# Patient Record
Sex: FEMALE | URBAN - METROPOLITAN AREA
[De-identification: names, ages, dates, MRNs, and addresses within clinical notes are randomized per-mention and may not be internally consistent; named-entity substitution may affect disease eponyms.]

---

## 2024-10-21 PROCEDURE — 88175 CYTOPATH C/V AUTO FLUID REDO: CPT

## 2024-10-22 ENCOUNTER — LAB REQUISITION (OUTPATIENT)
Dept: LAB | Facility: HOSPITAL | Age: 24
End: 2024-10-22

## 2024-10-22 DIAGNOSIS — Z11.51 ENCOUNTER FOR SCREENING FOR HUMAN PAPILLOMAVIRUS (HPV): ICD-10-CM

## 2024-10-22 DIAGNOSIS — Z34.90 ENCOUNTER FOR SUPERVISION OF NORMAL PREGNANCY, UNSPECIFIED, UNSPECIFIED TRIMESTER: ICD-10-CM

## 2024-10-22 LAB
C TRACH RRNA SPEC QL NAA+PROBE: NEGATIVE
N GONORRHOEA DNA SPEC QL PROBE+SIG AMP: NEGATIVE

## 2024-10-23 LAB — BACTERIA UR CULT: NO GROWTH

## 2024-11-04 LAB
CYTOLOGY CMNT CVX/VAG CYTO-IMP: NORMAL
LAB AP HPV GENOTYPE QUESTION: YES
LAB AP HPV HR: NORMAL
LABORATORY COMMENT REPORT: NORMAL
MENSTRUAL HX REPORTED: NORMAL
PATH REPORT.TOTAL CANCER: NORMAL

## 2025-01-13 ENCOUNTER — LAB (OUTPATIENT)
Dept: LAB | Facility: LAB | Age: 25
End: 2025-01-13

## 2025-01-13 DIAGNOSIS — Z34.90 ENCOUNTER FOR SUPERVISION OF NORMAL PREGNANCY, UNSPECIFIED, UNSPECIFIED TRIMESTER: Primary | ICD-10-CM

## 2025-01-13 LAB
ABO GROUP (TYPE) IN BLOOD: NORMAL
ANTIBODY SCREEN: NORMAL
ERYTHROCYTE [DISTWIDTH] IN BLOOD BY AUTOMATED COUNT: 14 % (ref 11.5–14.5)
GLUCOSE 1H P 50 G GLC PO SERPL-MCNC: 75 MG/DL
HCT VFR BLD AUTO: 36.5 % (ref 36–46)
HGB BLD-MCNC: 12.1 G/DL (ref 12–16)
MCH RBC QN AUTO: 31.3 PG (ref 26–34)
MCHC RBC AUTO-ENTMCNC: 33.2 G/DL (ref 32–36)
MCV RBC AUTO: 95 FL (ref 80–100)
NRBC BLD-RTO: 0 /100 WBCS (ref 0–0)
PLATELET # BLD AUTO: 165 X10*3/UL (ref 150–450)
RBC # BLD AUTO: 3.86 X10*6/UL (ref 4–5.2)
REFLEX ADDED, ANEMIA PANEL: NORMAL
RH FACTOR (ANTIGEN D): NORMAL
WBC # BLD AUTO: 9.9 X10*3/UL (ref 4.4–11.3)

## 2025-01-13 PROCEDURE — 82947 ASSAY GLUCOSE BLOOD QUANT: CPT

## 2025-01-13 PROCEDURE — 86901 BLOOD TYPING SEROLOGIC RH(D): CPT

## 2025-01-13 PROCEDURE — 86900 BLOOD TYPING SEROLOGIC ABO: CPT

## 2025-01-13 PROCEDURE — 86850 RBC ANTIBODY SCREEN: CPT

## 2025-01-13 PROCEDURE — 85027 COMPLETE CBC AUTOMATED: CPT

## 2025-02-03 RX ORDER — B-COMPLEX WITH VITAMIN C
1 TABLET ORAL DAILY
COMMUNITY

## 2025-02-10 ENCOUNTER — APPOINTMENT (OUTPATIENT)
Facility: CLINIC | Age: 25
End: 2025-02-10

## 2025-02-10 VITALS — SYSTOLIC BLOOD PRESSURE: 118 MMHG | DIASTOLIC BLOOD PRESSURE: 74 MMHG | WEIGHT: 170 LBS

## 2025-02-10 DIAGNOSIS — Z34.93 ENCOUNTER FOR PREGNANCY RELATED EXAMINATION IN THIRD TRIMESTER: ICD-10-CM

## 2025-02-10 DIAGNOSIS — O26.893 RH NEGATIVE STATE IN ANTEPARTUM PERIOD, THIRD TRIMESTER (HHS-HCC): ICD-10-CM

## 2025-02-10 DIAGNOSIS — Z3A.31 31 WEEKS GESTATION OF PREGNANCY (HHS-HCC): Primary | ICD-10-CM

## 2025-02-10 DIAGNOSIS — Z67.91 RH NEGATIVE STATE IN ANTEPARTUM PERIOD, THIRD TRIMESTER (HHS-HCC): ICD-10-CM

## 2025-02-10 LAB
POC BLOOD, URINE: NEGATIVE
POC GLUCOSE, URINE: NEGATIVE MG/DL
POC KETONES, URINE: NEGATIVE MG/DL
POC LEUKOCYTES, URINE: NEGATIVE
POC NITRITE,URINE: NEGATIVE
POC PROTEIN, URINE: NEGATIVE MG/DL

## 2025-02-10 PROCEDURE — 81003 URINALYSIS AUTO W/O SCOPE: CPT | Performed by: OBSTETRICS & GYNECOLOGY

## 2025-02-10 PROCEDURE — 0501F PRENATAL FLOW SHEET: CPT | Performed by: OBSTETRICS & GYNECOLOGY

## 2025-02-10 NOTE — PROGRESS NOTES
Subjective   Patient ID [id number]   Jenny River is a 24 y.o.  at 31w2d with a working estimated date of delivery of 2025, by Ultrasound who presents for a routine prenatal visit. She denies vaginal bleeding, leakage of fluid, decreased fetal movements, or contractions.    Her pregnancy is complicated by:  Problem List Items Addressed This Visit       31 weeks gestation of pregnancy ([facility]) - Primary    Overview     Desired provider in labor: [] CNM  [] Physician   [] Either Acceptable  [x] Blood Products: [x] Yes, accepts [] No, needs counseling  [x] Initial BMI: Could not be calculated   [x] Prenatal Labs:   [x] Cervical Cancer Screening up to date 10/21/2024 WNL  [x] Rh status: O- Rhogam received 25  [x] Screen for IPV and Substance Use Risk:  [x] Genetic Screening (cfDNA):  declined  [x] First Trimester Anatomy Screen (11-13.6 wks): declined  [x] Baby ASA (initiated):  [x] Pregnancy dated by: ultrasound    [x] Anatomy US: (19-20 wks)  [] Federal Sterilization consent signed (if indicated):  [x] 1hr GCT at 24-28wks: Done  [x] Rhogam (if indicated): Done  [] Fetal Surveillance (if indicated):  [x] Tdap (27-32 wks, may be given up to 36 wks if initial window missed): Declined  [x] RSV (32-36 wks) (Sept. to end of ): declined    [x] Feeding Intentions: considering breast  [] Postpartum Birth control method:   [] GBS at 36 - 37 wks:  [] 39 weeks discussion of IOL vs. Expectant management:  [] Mode of delivery ( anticipated ):           Rh negative state in antepartum period, third trimester ([facility])    Overview     RH NEG  Rhogam          Other Visit Diagnoses       Encounter for pregnancy related examination in third trimester        Relevant Orders    POCT UA Automated manually resulted (Completed)               Objective   Physical Exam:   Weight: 77.1 kg (170 lb)  Expected Total Weight Gain: Could not be calculated   Pregravid BMI: Could not be calculated  BP: 118/74  Fetal Heart Rate: 144  "Fundal Height (cm): 31 cm             Prenatal Labs  Urine Dip:  Lab Results   Component Value Date    KETONESU NEGATIVE 02/10/2025     Lab Results   Component Value Date    HGB 12.1 2025    HCT 36.5 2025    ABO O 2025     No results found for: \"PAPPA\", \"AFP\", \"HCG\", \"ESTRIOL\", \"INHBA\"  No results found for: \"GLUF\", \"GLUT1\", \"OHAVUQV8ZQ\", \"RCEZEVF9UA\"    Imaging  The most recent ultrasound was performed on   with a study GA of   and EFW of  .     Desired provider in labor: [] CNM  [] Physician   [] Either Acceptable  [x] Blood Products: [x] Yes, accepts [] No, needs counseling  [x] Initial BMI: Could not be calculated   [x] Prenatal Labs:   [x] Cervical Cancer Screening up to date  [x] Rh status: neg, Rhogam 25  [x] Screen for IPV and Substance Use Risk:  [x] Genetic Screening (cfDNA):  declined  [x] First Trimester Anatomy Screen (11-13.6 wks):  [x] Baby ASA (initiated):  [x] Pregnancy dated by:  8 wk US    [x] Anatomy US: (19-20 wks).  Declined level 2, LEVEL 1: YARI, posterior Placenta, no previa, 3V, girl  [x] Federal Sterilization consent signed (if indicated): NA  [x] 1hr GCT at 24-28wks: 75  [x] Rhogam (if indicated): 25  [] Fetal Surveillance (if indicated):  [x] Tdap (27-32 wks, may be given up to 36 wks if initial window missed): declined  [x] RSV (32-36 wks) (Sept. to end ):  declined    [] Feeding Intentions:  [] Postpartum Birth control method:   [] GBS at 36 - 37 wks:  [] 39 weeks discussion of IOL vs. Expectant management:  [] Mode of delivery ( anticipated ):        Assessment/Plan    at 31w2d. ERICK visit  Continue prenatal vitamin.  Labs reviewed.  Expected mode of delivery vaginal  Follow up in 2 week for a routine prenatal visit.  "

## 2025-03-04 PROBLEM — Z3A.34 34 WEEKS GESTATION OF PREGNANCY (HHS-HCC): Status: ACTIVE | Noted: 2025-02-10

## 2025-03-05 ENCOUNTER — APPOINTMENT (OUTPATIENT)
Facility: CLINIC | Age: 25
End: 2025-03-05

## 2025-03-05 VITALS
HEIGHT: 64 IN | BODY MASS INDEX: 29.02 KG/M2 | DIASTOLIC BLOOD PRESSURE: 80 MMHG | SYSTOLIC BLOOD PRESSURE: 124 MMHG | WEIGHT: 170 LBS

## 2025-03-05 DIAGNOSIS — O26.893 RH NEGATIVE STATE IN ANTEPARTUM PERIOD, THIRD TRIMESTER (HHS-HCC): Primary | ICD-10-CM

## 2025-03-05 DIAGNOSIS — Z67.91 RH NEGATIVE STATE IN ANTEPARTUM PERIOD, THIRD TRIMESTER (HHS-HCC): Primary | ICD-10-CM

## 2025-03-05 DIAGNOSIS — Z3A.34 34 WEEKS GESTATION OF PREGNANCY (HHS-HCC): ICD-10-CM

## 2025-03-05 PROCEDURE — 0501F PRENATAL FLOW SHEET: CPT | Performed by: OBSTETRICS & GYNECOLOGY

## 2025-03-05 ASSESSMENT — PATIENT HEALTH QUESTIONNAIRE - PHQ9
2. FEELING DOWN, DEPRESSED OR HOPELESS: NOT AT ALL
1. LITTLE INTEREST OR PLEASURE IN DOING THINGS: NOT AT ALL
SUM OF ALL RESPONSES TO PHQ9 QUESTIONS 1 AND 2: 0

## 2025-03-05 ASSESSMENT — COLUMBIA-SUICIDE SEVERITY RATING SCALE - C-SSRS
1. IN THE PAST MONTH, HAVE YOU WISHED YOU WERE DEAD OR WISHED YOU COULD GO TO SLEEP AND NOT WAKE UP?: NO
2. HAVE YOU ACTUALLY HAD ANY THOUGHTS OF KILLING YOURSELF?: NO
6. HAVE YOU EVER DONE ANYTHING, STARTED TO DO ANYTHING, OR PREPARED TO DO ANYTHING TO END YOUR LIFE?: NO

## 2025-03-05 NOTE — PROGRESS NOTES
Ob Visit  25     Objective   Physical Exam:   Weight: 77.1 kg (170 lb)  Pregravid BMI: 26  BP: 124/80  Fetal Heart Rate: 140 Fundal Height (cm): 34 cm Presentation: Vertex           ASSESSMENT   Jenny River is a 24 y.o.  at 34w3d with a working estimated date of delivery of 2025, by Ultrasound who presents for a routine prenatal visit.    The following concerns we addressed today:  PTL warnings  RR IOL    PLAN  -GBS next visit  -RTC in 2 weeks    Problem List Items Addressed This Visit       34 weeks gestation of pregnancy ([facility])    Overview     Desired provider in labor: [] CNM  [] Physician   [] Either Acceptable  [x] Blood Products: [x] Yes, accepts [] No, needs counseling  [x] Initial BMI: Could not be calculated   [x] Prenatal Labs:   [x] Cervical Cancer Screening up to date 10/21/2024 WNL  [x] Rh status: O- Rhogam received 25  [x] Screen for IPV and Substance Use Risk:  [x] Genetic Screening (cfDNA):  declined  [x] First Trimester Anatomy Screen (11-13.6 wks): declined  [x] Baby ASA (initiated):  [x] Pregnancy dated by: ultrasound    [x] Anatomy US: (19-20 wks) Declined level 2:  YARI, posterior placenta, no previa, 3V girl  [x] Federal Sterilization consent signed (if indicated): N/A  [x] 1hr GCT at 24-28wks: Done  [x] Rhogam (if indicated): Done  [] Fetal Surveillance (if indicated):  [x] Tdap (27-32 wks, may be given up to 36 wks if initial window missed): Declined  [x] RSV (32-36 wks) (Sept. to end ): declined    [x] Feeding Intentions: considering breast  [] Postpartum Birth control method:   [] GBS at 36 - 37 wks:  [] 39 weeks discussion of IOL vs. Expectant management:  [] Mode of delivery ( anticipated ):           Rh negative state in antepartum period, third trimester ([facility]) - Primary    Overview     RH NEG  Rhogam             Mikayla Lima MD

## 2025-03-24 PROBLEM — Z3A.37 37 WEEKS GESTATION OF PREGNANCY (HHS-HCC): Status: ACTIVE | Noted: 2025-02-10

## 2025-03-24 NOTE — PROGRESS NOTES
Ob Visit  25     Objective   Physical Exam:   Weight: 79.8 kg (176 lb)  Pregravid BMI: 24.53  BP: 114/68  Fetal Heart Rate: 140 Fundal Height (cm): 37 cm Presentation: Vertex  Dilation: Fingertip Effacement (%): 60 Fetal Station: -2    ASSESSMENT   Jenny River is a 24 y.o.  at 37w2d with a working estimated date of delivery of 2025, by Ultrasound who presents for a routine prenatal visit.    The following concerns we addressed today:  -labor warnings  -delivery planning.  Prefers IOL in 39th wk    PLAN  -GBS today  -RTC in 1 weeks    Problem List Items Addressed This Visit       37 weeks gestation of pregnancy ([facility]) - Primary    Overview     -,  Nikolai  -late screen, no first trimester care  -dating by 15 wk US  -declined genetic screening and level 2 US      Desired provider in labor: [] CNM  [] Physician   [] Either Acceptable  [x] Blood Products: [x] Yes, accepts [] No, needs counseling  [x] Initial BMI: Could not be calculated   [x] Prenatal Labs: normal  [x] Cervical Cancer Screening up to date 10/21/2024 WNL  [x] Rh status: O- Rhogam received 25  [x] Screen for IPV and Substance Use Risk: tobacco use prior to aware of pregnancy  [x] Genetic Screening (cfDNA):  declined  [x] First Trimester Anatomy Screen (11-13.6 wks): declined  [x] Baby ASA (initiated): recommended  [x] Pregnancy dated by: ultrasound    [x] Anatomy US: (19-20 wks) Declined level 2:  YARI, posterior placenta, no previa, 3V girl  [x] Federal Sterilization consent signed (if indicated): N/A  [x] 1hr GCT at 24-28wks: Done  [x] Rhogam (if indicated): Done  [] Fetal Surveillance (if indicated):  [x] Tdap (27-32 wks, may be given up to 36 wks if initial window missed): Declined  [x] RSV (32-36 wks) (Sept. to end of ): declined    [x] Feeding Intentions: considering breast  [] Postpartum Birth control method:   [x] GBS at 36 - 37 wks:  [] 39 weeks discussion of IOL vs. Expectant management:  [] Mode of  regular rate and rhythm , S1, S2 normal ,  , no murmurs, rubs, gallops , no edema delivery ( anticipated ):           Relevant Orders    Group B Streptococcus (GBS) Prenatal Screen, Culture    Rh negative state in antepartum period, third trimester ([facility])    Overview     RH NEG  Rhogam             Mikayla Lima MD

## 2025-03-25 ENCOUNTER — APPOINTMENT (OUTPATIENT)
Facility: CLINIC | Age: 25
End: 2025-03-25

## 2025-03-25 VITALS — WEIGHT: 176 LBS | BODY MASS INDEX: 30.21 KG/M2 | DIASTOLIC BLOOD PRESSURE: 68 MMHG | SYSTOLIC BLOOD PRESSURE: 114 MMHG

## 2025-03-25 DIAGNOSIS — Z67.91 RH NEGATIVE STATE IN ANTEPARTUM PERIOD, THIRD TRIMESTER (HHS-HCC): ICD-10-CM

## 2025-03-25 DIAGNOSIS — Z3A.37 37 WEEKS GESTATION OF PREGNANCY (HHS-HCC): Primary | ICD-10-CM

## 2025-03-25 DIAGNOSIS — O26.893 RH NEGATIVE STATE IN ANTEPARTUM PERIOD, THIRD TRIMESTER (HHS-HCC): ICD-10-CM

## 2025-03-25 PROCEDURE — 0501F PRENATAL FLOW SHEET: CPT | Performed by: OBSTETRICS & GYNECOLOGY

## 2025-03-25 RX ORDER — SYRING-NEEDL,DISP,INSUL,0.3 ML 29 G X1/2"
296 SYRINGE, EMPTY DISPOSABLE MISCELLANEOUS ONCE
COMMUNITY

## 2025-03-28 ENCOUNTER — PREP FOR PROCEDURE (OUTPATIENT)
Facility: CLINIC | Age: 25
End: 2025-03-28

## 2025-03-28 LAB — GP B STREP SPEC QL CULT: NORMAL

## 2025-03-30 PROBLEM — Z3A.38 38 WEEKS GESTATION OF PREGNANCY (HHS-HCC): Status: ACTIVE | Noted: 2025-02-10

## 2025-03-30 NOTE — PROGRESS NOTES
Ob Visit  25     Objective   Physical Exam:      Pregravid BMI: 24.53                     ASSESSMENT   Jenny River is a 24 y.o.  at 38w1d with a working estimated date of delivery of 2025, by Ultrasound who presents for a routine prenatal visit.    The following concerns we addressed today:  -GBS neg    PLAN    -RTC in *** weeks    Problem List Items Addressed This Visit       37 weeks gestation of pregnancy ([facility]) - Primary    Overview     -,  Nikolai  -late screen, no first trimester care  -dating by 15 wk US  -declined genetic screening and level 2 US      Desired provider in labor: [] CNM  [] Physician   [] Either Acceptable  [x] Blood Products: [x] Yes, accepts [] No, needs counseling  [x] Initial BMI: Could not be calculated   [x] Prenatal Labs: normal  [x] Cervical Cancer Screening up to date 10/21/2024 WNL  [x] Rh status: O- Rhogam received 25  [x] Screen for IPV and Substance Use Risk: tobacco use prior to aware of pregnancy  [x] Genetic Screening (cfDNA):  declined  [x] First Trimester Anatomy Screen (11-13.6 wks): declined  [x] Baby ASA (initiated): recommended  [x] Pregnancy dated by: ultrasound    [x] Anatomy US: (19-20 wks) Declined level 2:  YARI, posterior placenta, no previa, 3V girl  [x] Federal Sterilization consent signed (if indicated): N/A  [x] 1hr GCT at 24-28wks: Done  [x] Rhogam (if indicated): Done  [] Fetal Surveillance (if indicated):  [x] Tdap (27-32 wks, may be given up to 36 wks if initial window missed): Declined  [x] RSV (32-36 wks) (Sept. to end ): declined    [x] Feeding Intentions: considering breast  [] Postpartum Birth control method:   [x] GBS at 36 - 37 wks:  [] 39 weeks discussion of IOL vs. Expectant management:  [] Mode of delivery ( anticipated ):               Mikayla Lima MD   ):           Rh negative state in antepartum period, third trimester ([facility])    Overview     RH NEG  Rhogam             Mikayla Lima MD

## 2025-04-01 ENCOUNTER — APPOINTMENT (OUTPATIENT)
Facility: CLINIC | Age: 25
End: 2025-04-01

## 2025-04-01 VITALS — BODY MASS INDEX: 30.9 KG/M2 | DIASTOLIC BLOOD PRESSURE: 62 MMHG | WEIGHT: 180 LBS | SYSTOLIC BLOOD PRESSURE: 106 MMHG

## 2025-04-01 DIAGNOSIS — O26.893 RH NEGATIVE STATE IN ANTEPARTUM PERIOD, THIRD TRIMESTER (HHS-HCC): ICD-10-CM

## 2025-04-01 DIAGNOSIS — Z3A.38 38 WEEKS GESTATION OF PREGNANCY (HHS-HCC): Primary | ICD-10-CM

## 2025-04-01 DIAGNOSIS — Z67.91 RH NEGATIVE STATE IN ANTEPARTUM PERIOD, THIRD TRIMESTER (HHS-HCC): ICD-10-CM

## 2025-04-01 PROCEDURE — 0501F PRENATAL FLOW SHEET: CPT | Performed by: OBSTETRICS & GYNECOLOGY

## 2025-04-09 ENCOUNTER — APPOINTMENT (OUTPATIENT)
Dept: OBSTETRICS AND GYNECOLOGY | Facility: HOSPITAL | Age: 25
End: 2025-04-09
Payer: COMMERCIAL

## 2025-04-09 ENCOUNTER — HOSPITAL ENCOUNTER (INPATIENT)
Facility: HOSPITAL | Age: 25
LOS: 2 days | Discharge: HOME | End: 2025-04-11
Attending: OBSTETRICS & GYNECOLOGY | Admitting: OBSTETRICS & GYNECOLOGY
Payer: COMMERCIAL

## 2025-04-09 PROBLEM — Z34.90 TERM PREGNANCY (HHS-HCC): Status: ACTIVE | Noted: 2025-04-09

## 2025-04-09 PROBLEM — Z3A.38 38 WEEKS GESTATION OF PREGNANCY (HHS-HCC): Status: RESOLVED | Noted: 2025-02-10 | Resolved: 2025-04-09

## 2025-04-09 LAB
ABO GROUP (TYPE) IN BLOOD: NORMAL
ANTIBODY SCREEN: NORMAL
BB ANTIBODY IDENTIFICATION: NORMAL
CASE #: NORMAL
ERYTHROCYTE [DISTWIDTH] IN BLOOD BY AUTOMATED COUNT: 13.6 % (ref 11.5–14.5)
HCT VFR BLD AUTO: 36.4 % (ref 36–46)
HGB BLD-MCNC: 12.4 G/DL (ref 12–16)
HOLD SPECIMEN: NORMAL
MCH RBC QN AUTO: 32 PG (ref 26–34)
MCHC RBC AUTO-ENTMCNC: 34.1 G/DL (ref 32–36)
MCV RBC AUTO: 94 FL (ref 80–100)
NRBC BLD-RTO: 0 /100 WBCS (ref 0–0)
PLATELET # BLD AUTO: 142 X10*3/UL (ref 150–450)
RBC # BLD AUTO: 3.87 X10*6/UL (ref 4–5.2)
RH FACTOR (ANTIGEN D): NORMAL
WBC # BLD AUTO: 11.3 X10*3/UL (ref 4.4–11.3)

## 2025-04-09 PROCEDURE — 2500000004 HC RX 250 GENERAL PHARMACY W/ HCPCS (ALT 636 FOR OP/ED): Performed by: OBSTETRICS & GYNECOLOGY

## 2025-04-09 PROCEDURE — 7210000002 HC LABOR PER HOUR

## 2025-04-09 PROCEDURE — 85027 COMPLETE CBC AUTOMATED: CPT | Performed by: OBSTETRICS & GYNECOLOGY

## 2025-04-09 PROCEDURE — 2500000001 HC RX 250 WO HCPCS SELF ADMINISTERED DRUGS (ALT 637 FOR MEDICARE OP): Performed by: OBSTETRICS & GYNECOLOGY

## 2025-04-09 PROCEDURE — 86870 RBC ANTIBODY IDENTIFICATION: CPT

## 2025-04-09 PROCEDURE — 1120000001 HC OB PRIVATE ROOM DAILY

## 2025-04-09 PROCEDURE — 59050 FETAL MONITOR W/REPORT: CPT

## 2025-04-09 PROCEDURE — 36415 COLL VENOUS BLD VENIPUNCTURE: CPT | Performed by: OBSTETRICS & GYNECOLOGY

## 2025-04-09 PROCEDURE — 86901 BLOOD TYPING SEROLOGIC RH(D): CPT | Performed by: OBSTETRICS & GYNECOLOGY

## 2025-04-09 PROCEDURE — 86780 TREPONEMA PALLIDUM: CPT | Mod: GEALAB | Performed by: OBSTETRICS & GYNECOLOGY

## 2025-04-09 RX ORDER — CARBOPROST TROMETHAMINE 250 UG/ML
250 INJECTION, SOLUTION INTRAMUSCULAR ONCE AS NEEDED
Status: DISCONTINUED | OUTPATIENT
Start: 2025-04-09 | End: 2025-04-11

## 2025-04-09 RX ORDER — NALBUPHINE HYDROCHLORIDE 10 MG/ML
10 INJECTION INTRAMUSCULAR; INTRAVENOUS; SUBCUTANEOUS
Status: DISCONTINUED | OUTPATIENT
Start: 2025-04-09 | End: 2025-04-11

## 2025-04-09 RX ORDER — OXYTOCIN 10 [USP'U]/ML
10 INJECTION, SOLUTION INTRAMUSCULAR; INTRAVENOUS ONCE AS NEEDED
Status: DISCONTINUED | OUTPATIENT
Start: 2025-04-09 | End: 2025-04-11

## 2025-04-09 RX ORDER — TRANEXAMIC ACID 100 MG/ML
1000 INJECTION, SOLUTION INTRAVENOUS ONCE AS NEEDED
Status: DISCONTINUED | OUTPATIENT
Start: 2025-04-09 | End: 2025-04-11

## 2025-04-09 RX ORDER — SODIUM CHLORIDE, SODIUM LACTATE, POTASSIUM CHLORIDE, CALCIUM CHLORIDE 600; 310; 30; 20 MG/100ML; MG/100ML; MG/100ML; MG/100ML
75 INJECTION, SOLUTION INTRAVENOUS CONTINUOUS
Status: DISCONTINUED | OUTPATIENT
Start: 2025-04-09 | End: 2025-04-11

## 2025-04-09 RX ORDER — ONDANSETRON HYDROCHLORIDE 2 MG/ML
4 INJECTION, SOLUTION INTRAVENOUS EVERY 6 HOURS PRN
Status: DISCONTINUED | OUTPATIENT
Start: 2025-04-09 | End: 2025-04-11

## 2025-04-09 RX ORDER — NIFEDIPINE 10 MG/1
10 CAPSULE ORAL ONCE AS NEEDED
Status: DISCONTINUED | OUTPATIENT
Start: 2025-04-09 | End: 2025-04-11

## 2025-04-09 RX ORDER — LIDOCAINE HYDROCHLORIDE 10 MG/ML
30 INJECTION, SOLUTION INFILTRATION; PERINEURAL ONCE AS NEEDED
Status: COMPLETED | OUTPATIENT
Start: 2025-04-09 | End: 2025-04-10

## 2025-04-09 RX ORDER — OXYTOCIN/0.9 % SODIUM CHLORIDE 30/500 ML
60 PLASTIC BAG, INJECTION (ML) INTRAVENOUS ONCE AS NEEDED
Status: DISCONTINUED | OUTPATIENT
Start: 2025-04-09 | End: 2025-04-11

## 2025-04-09 RX ORDER — HYDRALAZINE HYDROCHLORIDE 20 MG/ML
5 INJECTION INTRAMUSCULAR; INTRAVENOUS ONCE AS NEEDED
Status: DISCONTINUED | OUTPATIENT
Start: 2025-04-09 | End: 2025-04-11

## 2025-04-09 RX ORDER — MISOPROSTOL 200 UG/1
800 TABLET ORAL ONCE AS NEEDED
Status: COMPLETED | OUTPATIENT
Start: 2025-04-09 | End: 2025-04-10

## 2025-04-09 RX ORDER — OXYTOCIN/0.9 % SODIUM CHLORIDE 30/500 ML
2-30 PLASTIC BAG, INJECTION (ML) INTRAVENOUS CONTINUOUS
Status: DISCONTINUED | OUTPATIENT
Start: 2025-04-09 | End: 2025-04-11

## 2025-04-09 RX ORDER — LABETALOL HYDROCHLORIDE 5 MG/ML
20 INJECTION, SOLUTION INTRAVENOUS ONCE AS NEEDED
Status: DISCONTINUED | OUTPATIENT
Start: 2025-04-09 | End: 2025-04-11

## 2025-04-09 RX ORDER — METHYLERGONOVINE MALEATE 0.2 MG/ML
0.2 INJECTION INTRAVENOUS ONCE AS NEEDED
Status: DISCONTINUED | OUTPATIENT
Start: 2025-04-09 | End: 2025-04-11

## 2025-04-09 RX ORDER — LOPERAMIDE HYDROCHLORIDE 2 MG/1
4 CAPSULE ORAL EVERY 2 HOUR PRN
Status: DISCONTINUED | OUTPATIENT
Start: 2025-04-09 | End: 2025-04-11

## 2025-04-09 RX ORDER — TERBUTALINE SULFATE 1 MG/ML
0.25 INJECTION SUBCUTANEOUS ONCE AS NEEDED
Status: DISCONTINUED | OUTPATIENT
Start: 2025-04-09 | End: 2025-04-11

## 2025-04-09 RX ORDER — ONDANSETRON 4 MG/1
4 TABLET, FILM COATED ORAL EVERY 6 HOURS PRN
Status: DISCONTINUED | OUTPATIENT
Start: 2025-04-09 | End: 2025-04-11

## 2025-04-09 RX ADMIN — MISOPROSTOL 25 MCG: 100 TABLET ORAL at 22:34

## 2025-04-09 RX ADMIN — MISOPROSTOL 25 MCG: 100 TABLET ORAL at 19:05

## 2025-04-09 RX ADMIN — SODIUM CHLORIDE, SODIUM LACTATE, POTASSIUM CHLORIDE, AND CALCIUM CHLORIDE 500 ML: .6; .31; .03; .02 INJECTION, SOLUTION INTRAVENOUS at 23:51

## 2025-04-09 SDOH — SOCIAL STABILITY: SOCIAL INSECURITY: DOES ANYONE TRY TO KEEP YOU FROM HAVING/CONTACTING OTHER FRIENDS OR DOING THINGS OUTSIDE YOUR HOME?: NO

## 2025-04-09 SDOH — ECONOMIC STABILITY: FOOD INSECURITY: WITHIN THE PAST 12 MONTHS, THE FOOD YOU BOUGHT JUST DIDN'T LAST AND YOU DIDN'T HAVE MONEY TO GET MORE.: NEVER TRUE

## 2025-04-09 SDOH — ECONOMIC STABILITY: FOOD INSECURITY: WITHIN THE PAST 12 MONTHS, YOU WORRIED THAT YOUR FOOD WOULD RUN OUT BEFORE YOU GOT THE MONEY TO BUY MORE.: NEVER TRUE

## 2025-04-09 SDOH — HEALTH STABILITY: MENTAL HEALTH: WISH TO BE DEAD (PAST 1 MONTH): NO

## 2025-04-09 SDOH — HEALTH STABILITY: MENTAL HEALTH: NON-SPECIFIC ACTIVE SUICIDAL THOUGHTS (PAST 1 MONTH): NO

## 2025-04-09 SDOH — SOCIAL STABILITY: SOCIAL INSECURITY: WITHIN THE LAST YEAR, HAVE YOU BEEN AFRAID OF YOUR PARTNER OR EX-PARTNER?: NO

## 2025-04-09 SDOH — SOCIAL STABILITY: SOCIAL INSECURITY
WITHIN THE LAST YEAR, HAVE YOU BEEN RAPED OR FORCED TO HAVE ANY KIND OF SEXUAL ACTIVITY BY YOUR PARTNER OR EX-PARTNER?: NO

## 2025-04-09 SDOH — SOCIAL STABILITY: SOCIAL INSECURITY: WITHIN THE LAST YEAR, HAVE YOU BEEN HUMILIATED OR EMOTIONALLY ABUSED IN OTHER WAYS BY YOUR PARTNER OR EX-PARTNER?: NO

## 2025-04-09 SDOH — SOCIAL STABILITY: SOCIAL INSECURITY: ARE YOU OR HAVE YOU BEEN THREATENED OR ABUSED PHYSICALLY, EMOTIONALLY, OR SEXUALLY BY ANYONE?: NO

## 2025-04-09 SDOH — SOCIAL STABILITY: SOCIAL INSECURITY: PHYSICAL ABUSE: DENIES

## 2025-04-09 SDOH — SOCIAL STABILITY: SOCIAL INSECURITY: ABUSE SCREEN: ADULT

## 2025-04-09 SDOH — SOCIAL STABILITY: SOCIAL INSECURITY: HAS ANYONE EVER THREATENED TO HURT YOUR FAMILY OR YOUR PETS?: NO

## 2025-04-09 SDOH — ECONOMIC STABILITY: HOUSING INSECURITY: DO YOU FEEL UNSAFE GOING BACK TO THE PLACE WHERE YOU ARE LIVING?: NO

## 2025-04-09 SDOH — SOCIAL STABILITY: SOCIAL INSECURITY
WITHIN THE LAST YEAR, HAVE YOU BEEN KICKED, HIT, SLAPPED, OR OTHERWISE PHYSICALLY HURT BY YOUR PARTNER OR EX-PARTNER?: NO

## 2025-04-09 SDOH — SOCIAL STABILITY: SOCIAL INSECURITY: VERBAL ABUSE: DENIES

## 2025-04-09 SDOH — HEALTH STABILITY: MENTAL HEALTH: WERE YOU ABLE TO COMPLETE ALL THE BEHAVIORAL HEALTH SCREENINGS?: YES

## 2025-04-09 SDOH — SOCIAL STABILITY: SOCIAL INSECURITY: DO YOU FEEL ANYONE HAS EXPLOITED OR TAKEN ADVANTAGE OF YOU FINANCIALLY OR OF YOUR PERSONAL PROPERTY?: NO

## 2025-04-09 SDOH — SOCIAL STABILITY: SOCIAL INSECURITY: HAVE YOU HAD THOUGHTS OF HARMING ANYONE ELSE?: NO

## 2025-04-09 SDOH — SOCIAL STABILITY: SOCIAL INSECURITY: ARE THERE ANY APPARENT SIGNS OF INJURIES/BEHAVIORS THAT COULD BE RELATED TO ABUSE/NEGLECT?: NO

## 2025-04-09 SDOH — SOCIAL STABILITY: SOCIAL INSECURITY: HAVE YOU HAD ANY THOUGHTS OF HARMING ANYONE ELSE?: NO

## 2025-04-09 ASSESSMENT — PAIN SCALES - GENERAL
PAINLEVEL_OUTOF10: 2
PAINLEVEL_OUTOF10: 0 - NO PAIN
PAINLEVEL_OUTOF10: 3

## 2025-04-09 ASSESSMENT — PATIENT HEALTH QUESTIONNAIRE - PHQ9
2. FEELING DOWN, DEPRESSED OR HOPELESS: NOT AT ALL
SUM OF ALL RESPONSES TO PHQ9 QUESTIONS 1 & 2: 0
1. LITTLE INTEREST OR PLEASURE IN DOING THINGS: NOT AT ALL

## 2025-04-09 ASSESSMENT — LIFESTYLE VARIABLES
HOW OFTEN DO YOU HAVE A DRINK CONTAINING ALCOHOL: NEVER
SKIP TO QUESTIONS 9-10: 1
AUDIT-C TOTAL SCORE: 0
AUDIT-C TOTAL SCORE: 0
HOW MANY STANDARD DRINKS CONTAINING ALCOHOL DO YOU HAVE ON A TYPICAL DAY: PATIENT DOES NOT DRINK
HOW OFTEN DO YOU HAVE 6 OR MORE DRINKS ON ONE OCCASION: NEVER

## 2025-04-09 ASSESSMENT — ACTIVITIES OF DAILY LIVING (ADL): LACK_OF_TRANSPORTATION: NO

## 2025-04-09 NOTE — H&P
OB Admission H&P    Assessment/Plan    Jenny River is a 24 y.o.  at 39w4d, ODILON: 2025, by Ultrasound, who presents for Induction of Labor.  Pregnancy notable for:  -declined genetic screening and level 2 US  -late presentation to care  -GBS neg    Plan  -Admit to L&D, consented  -T&S, CBC, and Syphilis  -Epidural at patient request  -Recheck as clinically indicated by maternal or fetal status  -Plan to initiate induction with ctytotec    Fetal Status  -NST reactive, reassuring   -Presentation vertex based on vaginal exam  -EFW 7 by leopold  -GBS neg    Postpartum  Contraception Plan: patient declined    Pregnancy Problems (from 10/21/24 to present)       Problem Noted Diagnosed Resolved    Term pregnancy ([facility]) 2025 by Mikayla Lima MD  No    Priority:  Medium       38 weeks gestation of pregnancy ([facility]) 2/10/2025 by WILDER Mujica-CNM  No    Priority:  Medium       Overview Addendum 3/24/2025 12:56 PM by Mikayla Lima MD     -,  Nikolai  -late screen, no first trimester care  -dating by 15 wk US  -declined genetic screening and level 2 US      Desired provider in labor: [] CNM  [] Physician   [] Either Acceptable  [x] Blood Products: [x] Yes, accepts [] No, needs counseling  [x] Initial BMI: Could not be calculated   [x] Prenatal Labs: normal  [x] Cervical Cancer Screening up to date 10/21/2024 WNL  [x] Rh status: O- Rhogam received 25  [x] Screen for IPV and Substance Use Risk: tobacco use prior to aware of pregnancy  [x] Genetic Screening (cfDNA):  declined  [x] First Trimester Anatomy Screen (11-13.6 wks): declined  [x] Baby ASA (initiated): recommended  [x] Pregnancy dated by: ultrasound    [x] Anatomy US: (19-20 wks) Declined level 2:  YARI, posterior placenta, no previa, 3V girl  [x] Federal Sterilization consent signed (if indicated): N/A  [x] 1hr GCT at 24-28wks: Done  [x] Rhogam (if indicated): Done  [] Fetal Surveillance (if indicated):  [x]  Tdap (27-32 wks, may be given up to 36 wks if initial window missed): Declined  [x] RSV (32-36 wks) (Sept. to end ): declined    [x] Feeding Intentions: considering breast  [] Postpartum Birth control method:   [x] GBS at 36 - 37 wks:  [] 39 weeks discussion of IOL vs. Expectant management:  [] Mode of delivery ( anticipated ):           Rh negative state in antepartum period, third trimester ([facility]) 2/10/2025 by ROSA Mujica  No    Priority:  Medium       Overview Signed 2/10/2025  5:09 PM by ROSA Mujica     RH NEG  Rhogam                 Subjective   Good fetal movement.  Denies vaginal bleeding., Denies contractions., Denies leaking of fluid.      Prenatal Provider Mikayla Lima MD      OB History    Para Term  AB Living   1 0 0 0 0 0   SAB IAB Ectopic Multiple Live Births   0 0 0 0 0      # Outcome Date GA Lbr Emng/2nd Weight Sex Type Anes PTL Lv   1 Current                No past surgical history on file.    Social History     Tobacco Use    Smoking status: Former     Types: Cigarettes    Smokeless tobacco: Never   Substance Use Topics    Alcohol use: Not Currently     Comment: Socially       No Known Allergies    Medications Prior to Admission   Medication Sig Dispense Refill Last Dose/Taking    magnesium citrate solution Take 296 mL by mouth 1 time.   2025 Evening    prenatal vitamin, iron-folic, (Prenatal Vitamin) 27 mg iron-800 mcg folic acid tablet Take 1 tablet by mouth once daily.   2025 Evening     Objective     Last Vitals  Temp Pulse Resp BP MAP O2 Sat   37 °C (98.6 °F) 99 16 132/86 103 97 %     Blood Pressures         2025  1752             BP: 132/86             Physical Exam  General: NAD, mood appropriate  Cardiopulmonary: warm and well perfused, breathing comfortably on room air  Abdomen: Gravid, non-tender  Extremities: Symmetric  Speculum Exam: deferred  Cervix:   /  /      Chaperone Present: Yes.  Chaperone Name/Title: Rhina  RN  Examination Chaperoned: Genitourinary Exam     Fetal Monitoring  Baseline: 130 bpm, Variability: moderate,  Accelerations: present and Decelerations: none  Uterine Activity: Irregular contractions  Interpretation: Reactive    Bedside ultrasound: No    Labs in chart were reviewed.          Prenatal labs reviewed, not remarkable.

## 2025-04-10 PROBLEM — O26.893 RH NEGATIVE STATE IN ANTEPARTUM PERIOD, THIRD TRIMESTER (HHS-HCC): Status: RESOLVED | Noted: 2025-02-10 | Resolved: 2025-04-10

## 2025-04-10 PROBLEM — Z67.91 RH NEGATIVE STATE IN ANTEPARTUM PERIOD, THIRD TRIMESTER (HHS-HCC): Status: RESOLVED | Noted: 2025-02-10 | Resolved: 2025-04-10

## 2025-04-10 PROBLEM — Z34.90 TERM PREGNANCY (HHS-HCC): Status: RESOLVED | Noted: 2025-04-09 | Resolved: 2025-04-10

## 2025-04-10 LAB — TREPONEMA PALLIDUM IGG+IGM AB [PRESENCE] IN SERUM OR PLASMA BY IMMUNOASSAY: NONREACTIVE

## 2025-04-10 PROCEDURE — 2500000001 HC RX 250 WO HCPCS SELF ADMINISTERED DRUGS (ALT 637 FOR MEDICARE OP): Performed by: OBSTETRICS & GYNECOLOGY

## 2025-04-10 PROCEDURE — 7210000002 HC LABOR PER HOUR

## 2025-04-10 PROCEDURE — 1120000001 HC OB PRIVATE ROOM DAILY

## 2025-04-10 PROCEDURE — 59400 OBSTETRICAL CARE: CPT | Performed by: OBSTETRICS & GYNECOLOGY

## 2025-04-10 PROCEDURE — 0UQMXZZ REPAIR VULVA, EXTERNAL APPROACH: ICD-10-PCS | Performed by: OBSTETRICS & GYNECOLOGY

## 2025-04-10 PROCEDURE — 3E033VJ INTRODUCTION OF OTHER HORMONE INTO PERIPHERAL VEIN, PERCUTANEOUS APPROACH: ICD-10-PCS | Performed by: OBSTETRICS & GYNECOLOGY

## 2025-04-10 PROCEDURE — 7100000016 HC LABOR RECOVERY PER HOUR

## 2025-04-10 PROCEDURE — 10907ZC DRAINAGE OF AMNIOTIC FLUID, THERAPEUTIC FROM PRODUCTS OF CONCEPTION, VIA NATURAL OR ARTIFICIAL OPENING: ICD-10-PCS | Performed by: OBSTETRICS & GYNECOLOGY

## 2025-04-10 PROCEDURE — 2500000002 HC RX 250 W HCPCS SELF ADMINISTERED DRUGS (ALT 637 FOR MEDICARE OP, ALT 636 FOR OP/ED): Performed by: OBSTETRICS & GYNECOLOGY

## 2025-04-10 PROCEDURE — 3E0P7VZ INTRODUCTION OF HORMONE INTO FEMALE REPRODUCTIVE, VIA NATURAL OR ARTIFICIAL OPENING: ICD-10-PCS | Performed by: OBSTETRICS & GYNECOLOGY

## 2025-04-10 PROCEDURE — 3E0DXGC INTRODUCTION OF OTHER THERAPEUTIC SUBSTANCE INTO MOUTH AND PHARYNX, EXTERNAL APPROACH: ICD-10-PCS | Performed by: OBSTETRICS & GYNECOLOGY

## 2025-04-10 PROCEDURE — 2500000004 HC RX 250 GENERAL PHARMACY W/ HCPCS (ALT 636 FOR OP/ED): Performed by: OBSTETRICS & GYNECOLOGY

## 2025-04-10 PROCEDURE — 59409 OBSTETRICAL CARE: CPT | Performed by: OBSTETRICS & GYNECOLOGY

## 2025-04-10 RX ORDER — POLYETHYLENE GLYCOL 3350 17 G/17G
17 POWDER, FOR SOLUTION ORAL 2 TIMES DAILY PRN
Status: DISCONTINUED | OUTPATIENT
Start: 2025-04-10 | End: 2025-04-11 | Stop reason: HOSPADM

## 2025-04-10 RX ORDER — DIPHENHYDRAMINE HCL 25 MG
25 CAPSULE ORAL EVERY 6 HOURS PRN
Status: DISCONTINUED | OUTPATIENT
Start: 2025-04-10 | End: 2025-04-11 | Stop reason: HOSPADM

## 2025-04-10 RX ORDER — IBUPROFEN 600 MG/1
600 TABLET ORAL EVERY 6 HOURS
Status: DISCONTINUED | OUTPATIENT
Start: 2025-04-10 | End: 2025-04-11 | Stop reason: HOSPADM

## 2025-04-10 RX ORDER — TRANEXAMIC ACID 100 MG/ML
1000 INJECTION, SOLUTION INTRAVENOUS ONCE AS NEEDED
Status: DISCONTINUED | OUTPATIENT
Start: 2025-04-10 | End: 2025-04-11 | Stop reason: HOSPADM

## 2025-04-10 RX ORDER — OXYTOCIN/0.9 % SODIUM CHLORIDE 30/500 ML
60 PLASTIC BAG, INJECTION (ML) INTRAVENOUS ONCE AS NEEDED
Status: DISCONTINUED | OUTPATIENT
Start: 2025-04-10 | End: 2025-04-11 | Stop reason: HOSPADM

## 2025-04-10 RX ORDER — ACETAMINOPHEN 325 MG/1
975 TABLET ORAL EVERY 6 HOURS
Status: DISCONTINUED | OUTPATIENT
Start: 2025-04-10 | End: 2025-04-11 | Stop reason: HOSPADM

## 2025-04-10 RX ORDER — ADHESIVE BANDAGE
10 BANDAGE TOPICAL
Status: DISCONTINUED | OUTPATIENT
Start: 2025-04-10 | End: 2025-04-11 | Stop reason: HOSPADM

## 2025-04-10 RX ORDER — SIMETHICONE 80 MG
80 TABLET,CHEWABLE ORAL 4 TIMES DAILY PRN
Status: DISCONTINUED | OUTPATIENT
Start: 2025-04-10 | End: 2025-04-11 | Stop reason: HOSPADM

## 2025-04-10 RX ORDER — ONDANSETRON 4 MG/1
4 TABLET, FILM COATED ORAL EVERY 6 HOURS PRN
Status: DISCONTINUED | OUTPATIENT
Start: 2025-04-10 | End: 2025-04-11 | Stop reason: HOSPADM

## 2025-04-10 RX ORDER — ONDANSETRON HYDROCHLORIDE 2 MG/ML
4 INJECTION, SOLUTION INTRAVENOUS EVERY 6 HOURS PRN
Status: DISCONTINUED | OUTPATIENT
Start: 2025-04-10 | End: 2025-04-11 | Stop reason: HOSPADM

## 2025-04-10 RX ORDER — DIPHENHYDRAMINE HYDROCHLORIDE 50 MG/ML
25 INJECTION, SOLUTION INTRAMUSCULAR; INTRAVENOUS EVERY 6 HOURS PRN
Status: DISCONTINUED | OUTPATIENT
Start: 2025-04-10 | End: 2025-04-11 | Stop reason: HOSPADM

## 2025-04-10 RX ORDER — HYDRALAZINE HYDROCHLORIDE 20 MG/ML
5 INJECTION INTRAMUSCULAR; INTRAVENOUS ONCE AS NEEDED
Status: DISCONTINUED | OUTPATIENT
Start: 2025-04-10 | End: 2025-04-11 | Stop reason: HOSPADM

## 2025-04-10 RX ORDER — LABETALOL HYDROCHLORIDE 5 MG/ML
20 INJECTION, SOLUTION INTRAVENOUS ONCE AS NEEDED
Status: DISCONTINUED | OUTPATIENT
Start: 2025-04-10 | End: 2025-04-11 | Stop reason: HOSPADM

## 2025-04-10 RX ORDER — LOPERAMIDE HYDROCHLORIDE 2 MG/1
4 CAPSULE ORAL EVERY 2 HOUR PRN
Status: DISCONTINUED | OUTPATIENT
Start: 2025-04-10 | End: 2025-04-11 | Stop reason: HOSPADM

## 2025-04-10 RX ORDER — CARBOPROST TROMETHAMINE 250 UG/ML
250 INJECTION, SOLUTION INTRAMUSCULAR ONCE AS NEEDED
Status: DISCONTINUED | OUTPATIENT
Start: 2025-04-10 | End: 2025-04-11 | Stop reason: HOSPADM

## 2025-04-10 RX ORDER — MISOPROSTOL 200 UG/1
800 TABLET ORAL ONCE AS NEEDED
Status: DISCONTINUED | OUTPATIENT
Start: 2025-04-10 | End: 2025-04-11 | Stop reason: HOSPADM

## 2025-04-10 RX ORDER — METHYLERGONOVINE MALEATE 0.2 MG/ML
0.2 INJECTION INTRAVENOUS ONCE AS NEEDED
Status: DISCONTINUED | OUTPATIENT
Start: 2025-04-10 | End: 2025-04-11 | Stop reason: HOSPADM

## 2025-04-10 RX ORDER — OXYTOCIN 10 [USP'U]/ML
10 INJECTION, SOLUTION INTRAMUSCULAR; INTRAVENOUS ONCE AS NEEDED
Status: DISCONTINUED | OUTPATIENT
Start: 2025-04-10 | End: 2025-04-11 | Stop reason: HOSPADM

## 2025-04-10 RX ORDER — ENOXAPARIN SODIUM 100 MG/ML
40 INJECTION SUBCUTANEOUS EVERY 24 HOURS
Status: DISCONTINUED | OUTPATIENT
Start: 2025-04-11 | End: 2025-04-11 | Stop reason: HOSPADM

## 2025-04-10 RX ADMIN — Medication 2 MILLI-UNITS/MIN: at 09:15

## 2025-04-10 RX ADMIN — LIDOCAINE HYDROCHLORIDE 20 ML: 10 INJECTION, SOLUTION INFILTRATION; PERINEURAL at 16:15

## 2025-04-10 RX ADMIN — MISOPROSTOL 25 MCG: 100 TABLET ORAL at 04:46

## 2025-04-10 RX ADMIN — SODIUM CHLORIDE, SODIUM LACTATE, POTASSIUM CHLORIDE, AND CALCIUM CHLORIDE 75 ML/HR: .6; .31; .03; .02 INJECTION, SOLUTION INTRAVENOUS at 13:46

## 2025-04-10 RX ADMIN — MISOPROSTOL 25 MCG: 100 TABLET ORAL at 02:17

## 2025-04-10 RX ADMIN — ACETAMINOPHEN 975 MG: 325 TABLET ORAL at 18:30

## 2025-04-10 RX ADMIN — NALBUPHINE HYDROCHLORIDE 10 MG: 10 INJECTION, SOLUTION INTRAMUSCULAR; INTRAVENOUS; SUBCUTANEOUS at 15:08

## 2025-04-10 RX ADMIN — BENZOCAINE AND LEVOMENTHOL 1 APPLICATION: 200; 5 SPRAY TOPICAL at 18:30

## 2025-04-10 RX ADMIN — IBUPROFEN 600 MG: 600 TABLET ORAL at 18:30

## 2025-04-10 RX ADMIN — MISOPROSTOL 800 MCG: 200 TABLET ORAL at 16:34

## 2025-04-10 RX ADMIN — SODIUM CHLORIDE, SODIUM LACTATE, POTASSIUM CHLORIDE, AND CALCIUM CHLORIDE 75 ML/HR: .6; .31; .03; .02 INJECTION, SOLUTION INTRAVENOUS at 09:15

## 2025-04-10 ASSESSMENT — PAIN SCALES - GENERAL
PAINLEVEL_OUTOF10: 4
PAINLEVEL_OUTOF10: 0 - NO PAIN
PAINLEVEL_OUTOF10: 3
PAINLEVEL_OUTOF10: 6
PAINLEVEL_OUTOF10: 3
PAINLEVEL_OUTOF10: 1
PAINLEVEL_OUTOF10: 3
PAINLEVEL_OUTOF10: 1
PAINLEVEL_OUTOF10: 4
PAINLEVEL_OUTOF10: 5 - MODERATE PAIN
PAINLEVEL_OUTOF10: 1
PAINLEVEL_OUTOF10: 3
PAINLEVEL_OUTOF10: 3
PAINLEVEL_OUTOF10: 4
PAINLEVEL_OUTOF10: 3
PAINLEVEL_OUTOF10: 5 - MODERATE PAIN
PAINLEVEL_OUTOF10: 4

## 2025-04-10 NOTE — CARE PLAN
Problem: Pain - Adult  Goal: Verbalizes/displays adequate comfort level or baseline comfort level  4/10/2025 1847 by Karlene Zavala RN  Outcome: Progressing  4/10/2025 1846 by Karlene Zavala RN  Outcome: Progressing     Problem: Postpartum  Goal: Experiences normal postpartum course  Outcome: Progressing  Goal: Appropriate maternal -  bonding  Outcome: Progressing  Goal: Establish and maintain infant feeding pattern for adequate nutrition  Outcome: Progressing  Goal: Incisions, wounds, or drain sites healing without S/S of infection  Outcome: Progressing  Goal: No s/sx infection  Outcome: Progressing  Goal: No s/sx of hemorrhage  Outcome: Progressing  Goal: Minimal s/sx of HDP and BP<160/110  Outcome: Progressing     Problem: Safety - Adult  Goal: Free from fall injury  Outcome: Progressing     Problem: Discharge Planning  Goal: Discharge to home or other facility with appropriate resources  Outcome: Progressing

## 2025-04-11 VITALS
OXYGEN SATURATION: 98 % | BODY MASS INDEX: 31.05 KG/M2 | RESPIRATION RATE: 18 BRPM | HEART RATE: 70 BPM | SYSTOLIC BLOOD PRESSURE: 124 MMHG | WEIGHT: 181.88 LBS | HEIGHT: 64 IN | DIASTOLIC BLOOD PRESSURE: 74 MMHG | TEMPERATURE: 98.2 F

## 2025-04-11 LAB — FETAL MATERNAL SCREEN: NORMAL

## 2025-04-11 PROCEDURE — 2500000004 HC RX 250 GENERAL PHARMACY W/ HCPCS (ALT 636 FOR OP/ED): Performed by: OBSTETRICS & GYNECOLOGY

## 2025-04-11 PROCEDURE — 2500000001 HC RX 250 WO HCPCS SELF ADMINISTERED DRUGS (ALT 637 FOR MEDICARE OP): Performed by: OBSTETRICS & GYNECOLOGY

## 2025-04-11 PROCEDURE — 36415 COLL VENOUS BLD VENIPUNCTURE: CPT | Performed by: OBSTETRICS & GYNECOLOGY

## 2025-04-11 PROCEDURE — 85461 HEMOGLOBIN FETAL: CPT

## 2025-04-11 PROCEDURE — 3E0234Z INTRODUCTION OF SERUM, TOXOID AND VACCINE INTO MUSCLE, PERCUTANEOUS APPROACH: ICD-10-PCS | Performed by: OBSTETRICS & GYNECOLOGY

## 2025-04-11 RX ADMIN — IBUPROFEN 600 MG: 600 TABLET ORAL at 06:20

## 2025-04-11 RX ADMIN — ACETAMINOPHEN 975 MG: 325 TABLET ORAL at 06:20

## 2025-04-11 RX ADMIN — IBUPROFEN 600 MG: 600 TABLET ORAL at 00:32

## 2025-04-11 RX ADMIN — ENOXAPARIN SODIUM 40 MG: 40 INJECTION SUBCUTANEOUS at 06:20

## 2025-04-11 RX ADMIN — RHO(D) IMMUNE GLOBULIN (HUMAN) 300 MCG: 1500 SOLUTION INTRAMUSCULAR at 14:44

## 2025-04-11 RX ADMIN — IBUPROFEN 600 MG: 600 TABLET ORAL at 12:38

## 2025-04-11 RX ADMIN — ACETAMINOPHEN 975 MG: 325 TABLET ORAL at 00:32

## 2025-04-11 RX ADMIN — ACETAMINOPHEN 975 MG: 325 TABLET ORAL at 12:38

## 2025-04-11 ASSESSMENT — PAIN SCALES - GENERAL
PAINLEVEL_OUTOF10: 1
PAINLEVEL_OUTOF10: 0 - NO PAIN
PAINLEVEL_OUTOF10: 0 - NO PAIN
PAINLEVEL_OUTOF10: 1

## 2025-04-11 ASSESSMENT — PAIN SCALES - PAIN ASSESSMENT IN ADVANCED DEMENTIA (PAINAD): TOTALSCORE: MEDICATION (SEE MAR)

## 2025-04-11 NOTE — CARE PLAN
Problem: Pain - Adult  Goal: Verbalizes/displays adequate comfort level or baseline comfort level  Outcome: Met     Problem: Postpartum  Goal: Experiences normal postpartum course  Outcome: Met  Goal: Appropriate maternal -  bonding  Outcome: Met  Goal: Establish and maintain infant feeding pattern for adequate nutrition  Outcome: Met  Goal: Incisions, wounds, or drain sites healing without S/S of infection  Outcome: Met  Goal: No s/sx infection  Outcome: Met  Goal: No s/sx of hemorrhage  Outcome: Met  Goal: Minimal s/sx of HDP and BP<160/110  Outcome: Met     Problem: Safety - Adult  Goal: Free from fall injury  Outcome: Met     Problem: Discharge Planning  Goal: Discharge to home or other facility with appropriate resources  Outcome: Met

## 2025-04-11 NOTE — L&D DELIVERY NOTE
Vaginal Delivery Note    Patient Name: Jenny River  : 2000  MRN: [id number]  Age: 24 y.o.    /Para:   Gestational Age: 39w5d    Date of Delivery: 4/10/2025    Procedure: Normal Spontaneous Vaginal Delivery    Delivery Provider: Mikayla Lima MD    Description of Procedure:  Delivery of viable infant under no anesthesia. Delayed clamping was performed. The infant was placed skin to skin.. Cord gases were not sent.  Cord blood was collected. Placenta delivered intact and fundus was firm    Bilateral labial Laceration identified and repaired.    Findings:   Amniotic fluid Clear, Female infant in Vertex Occiput Anterior presentation, APGARS 9 , 9 .  Birth Weight 3.22 kg.    Complications: None    Quantitative Blood Loss:   Delivery Blood Loss   Intrapartum & Postpartum: 04/10/25 0410 - 04/10/25 2145    Delivery Admission: 25 1739 - 04/10/25 2145         Intrapartum & Postpartum Delivery Admission    Quantitative Blood Loss - (mL) Hospital Encounter 595 mL 595 mL    Total  595 mL 595 mL            Blood products:      Uterotonics/Hemostatic Agent: IV Pitocin 30 units    Specimen:   Placenta  Delivered: 4/10/2025  4:13 PM  Appearance: Intact  Removal: Spontaneous    Disposition: discarded    Sponge/Instrument/Needle Counts: The sponge, lap and needle counts were correct.    Patient Disposition: Patient recovering on labor and delivery in stable condition.    Additional Procedures:  None    Shivani River [[id number]]      Labor Events    Rupture date/time: 4/10/2025 0810  Rupture type: Artificial  Fluid color: Clear  Fluid odor: None  Labor type: Induced Onset of Labor  Labor allowed to proceed with plans for an attempted vaginal birth?: Yes  Induction: Misoprostol, Oxytocin  Induction indications: Risk Reducing  Complications: None       Labor Event Times    Dilation complete date/time: 4/10/2025 1552  Start pushing date/time: 4/10/2025 1553       Labor Length    2nd stage: 0h  18m  3rd stage: 0h 03m       Placenta    Placenta delivery date/time: 4/10/2025 1613  Placenta removal: Spontaneous  Placenta appearance: Intact  Placenta disposition: discarded       Cord    Vessels: 3 vessels  Complications: Nuchal  Nuchal cord description: loose nuchal cord  Number of loops: 1  Delayed cord clamping?: Yes  Cord blood disposition: Discarded  Gases sent?: No       Lacerations    Episiotomy: None  Perineal laceration: None  Labial laceration?: Yes  Labial laceration location: bilateral  Labial laceration repaired?: Yes  Repair suture: 4-0 Synthetic Suture       Anesthesia    Method: None       Operative Delivery    Forceps attempted?: No  Vacuum extractor attempted?: No       Shoulder Dystocia    Shoulder dystocia present?: No        Delivery    Birth date/time: 4/10/2025 16:10:00  Delivery type: Vaginal, Spontaneous  Complications: None       Apgars    Living status: Living  Apgar Component Scores:  1 min.:  5 min.:  10 min.:  15 min.:  20 min.:    Skin color:  1  1       Heart rate:  2  2       Reflex irritability:  2  2       Muscle tone:  2  2       Respiratory effort:  2  2       Total:  9  9       Apgars assigned by: ANAHI KIMBALL       Delivery Providers    Delivering clinician: Mikayla Lima MD   Provider Role    Karlene Zavala RN Delivery Nurse    Kathryn Kimball, RN Nursery Nurse     Resident

## 2025-04-11 NOTE — DISCHARGE SUMMARY
Discharge Summary    Admission Date: 4/9/2025  Discharge Date:     Discharge Diagnosis  Term pregnancy ([facility])    Hospital Course  Delivery Date: 4/10/2025 4:10 PM  Delivery type: Vaginal, Spontaneous   GA at delivery: 39w5d  Outcome: Living  Anesthesia during delivery: None  Intrapartum complications: None  Feeding method: Breastfeeding Status: Unknown     Procedures: none  Contraception at discharge: condoms    Pertinent Physical Exam At Time of Discharge    Last Vitals:  Temp Pulse Resp BP MAP Pulse Ox   36.5 °C (97.7 °F) 82 18 122/67 88 96 %     Discharge Meds     Your medication list        CONTINUE taking these medications        Instructions Last Dose Given Next Dose Due   magnesium citrate solution           Prenatal Vitamin 27 mg iron- 0.8 mg tablet  Generic drug: prenatal vitamin (iron-folic)                   Complications Requiring Follow-Up  Routine pp care    Test Results Pending At Discharge  Pending Labs       No current pending labs.          Outpatient Follow-Up  No future appointments.    Mikayla Lima MD

## 2025-04-11 NOTE — PROGRESS NOTES
Postpartum Progress Note    Assessment/Plan   Jenny River is a 24 y.o., , who delivered at 39w5d gestation and is now postpartum day 1.  -doing well, feeling great.  Bleeding stable  -BP with 2 mild in recovery and during transitioning, pt. NCB.  Continue to monitor    Hospital course: no complications  Vaginal Birth   The patient's blood type is O NEG. The baby's blood type is O POS. Rhogam is indicated.    Subjective   Her pain is well controlled with current medications  She is passing flatus  She is ambulating well  She is tolerating a Adult diet Regular  She reports no breast or nursing problems  She denies emotional concerns today   Her plan for contraception is condoms     Objective   Allergies:   Patient has no known allergies.         Last Vitals:  Temp Pulse Resp BP MAP Pulse Ox   36.6 °C (97.9 °F) 65 18 113/62 78 97 %     Vitals Min/Max Last 24 Hours:  Temp  Min: 36.5 °C (97.7 °F)  Max: 36.9 °C (98.4 °F)  Pulse  Min: 65  Max: 92  Resp  Min: 18  Max: 18  BP  Min: 108/60  Max: 144/80  MAP (mmHg)  Min: 78  Max: 104    Intake/Output:     Intake/Output Summary (Last 24 hours) at 2025 0625  Last data filed at 4/10/2025 1815  Gross per 24 hour   Intake --   Output 595 ml   Net -595 ml     Physical Exam:  Physical Exam  Constitutional:       General: She is not in acute distress.     Appearance: Normal appearance.   Pulmonary:      Effort: Pulmonary effort is normal.   Abdominal:      General: Bowel sounds are normal.      Palpations: Abdomen is soft.      Uterus firm, below U  Musculoskeletal:         General: Normal range of motion.   Neurological:      Mental Status: She is alert.   Psychiatric:         Mood and Affect: Mood normal.     Lab Data:

## 2025-05-12 NOTE — PROGRESS NOTES
Postpartum Follow-up Visit     Subjective   Jenny River is a 24 y.o.  female here for postpartum visit after a spontaneous vaginal delivery She delivered at [facility] by Mikayla Lima MD.  The delivery was complicated by uncomplicated  She had a Girl that weighed 3.22 kg and she named [place].  She is Breast Feeding.  Her bleeding is described has stopped.   She denies feelings of depression, anxiety, tearfulness, inability to cope or restlessness.  She has not resumed sexual activity.  She has decided on condoms  Her last pap was in 10/2024 and was Normal    @ROS@    Obstetrical History   OB History    Para Term  AB Living   1 1 1   1   SAB IAB Ectopic Multiple Live Births      0 1      # Outcome Date GA Lbr Meng/2nd Weight Sex Type Anes PTL Lv   1 Term 04/10/25 39w5d / 00:18 3.22 kg F Vag-Spont None  DANIEL       Past Medical History  Medical History[1]     Past Surgical History   Surgical History[2]    Social History  Social History     Tobacco Use    Smoking status: Former     Types: Cigarettes    Smokeless tobacco: Never   Substance Use Topics    Alcohol use: Not Currently     Comment: Socially     Substance and Sexual Activity   Drug Use Never       Allergies  Patient has no known allergies.     Medications  Prescriptions Prior to Admission[3]    OBJECTIVE  LMP 07/15/2024     Physical Exam  Constitutional:       General: She is not in acute distress.     Appearance: Normal appearance.   Pulmonary:      Effort: Pulmonary effort is normal.   Abdominal:      General: Bowel sounds are normal.      Palpations: Abdomen is soft.      Uterus firm, nontender, normal size  Musculoskeletal:         General: Normal range of motion.   Neurological:      Mental Status: She is alert.   Psychiatric:         Mood and Affect: Mood normal.   Pelvic:    Vulva: normal appearance, Perineum well healed    Perineum: well supported, well healed    Vagina: well healed, no noted defects, normal discharge     Cervix:  normal appearance, no CMT    Uterus: normal size, nontender    Adnexa: no masses, nontender    PP visit following:  The patient has recovered well from her delivery and may resume all normal activity.  She was counseled on her options for birth control including risks and benefits of each method.  She was instructed to continue with a daily vitamin.  Counseling was provided regarding risks of close interval pregnancies.  Signs and symptoms of postpartum depression were reviewed.  She will call should any new concerns arise.         [1] No past medical history on file.  [2] No past surgical history on file.  [3] (Not in a hospital admission)

## 2025-05-13 ENCOUNTER — APPOINTMENT (OUTPATIENT)
Facility: CLINIC | Age: 25
End: 2025-05-13
Payer: COMMERCIAL

## 2025-05-13 VITALS
WEIGHT: 152 LBS | DIASTOLIC BLOOD PRESSURE: 74 MMHG | SYSTOLIC BLOOD PRESSURE: 116 MMHG | BODY MASS INDEX: 25.95 KG/M2 | HEIGHT: 64 IN

## 2025-05-13 PROCEDURE — 99024 POSTOP FOLLOW-UP VISIT: CPT | Performed by: OBSTETRICS & GYNECOLOGY
